# Patient Record
Sex: MALE | Race: WHITE
[De-identification: names, ages, dates, MRNs, and addresses within clinical notes are randomized per-mention and may not be internally consistent; named-entity substitution may affect disease eponyms.]

---

## 2023-10-25 ENCOUNTER — HOSPITAL ENCOUNTER (INPATIENT)
Dept: HOSPITAL 47 - EC | Age: 63
LOS: 3 days | Discharge: HOME | DRG: 897 | End: 2023-10-28
Attending: INTERNAL MEDICINE | Admitting: INTERNAL MEDICINE
Payer: MEDICARE
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
## 2023-10-25 DIAGNOSIS — F17.210: ICD-10-CM

## 2023-10-25 DIAGNOSIS — E87.6: ICD-10-CM

## 2023-10-25 DIAGNOSIS — F10.239: Primary | ICD-10-CM

## 2023-10-25 DIAGNOSIS — Z28.310: ICD-10-CM

## 2023-10-25 DIAGNOSIS — I10: ICD-10-CM

## 2023-10-25 LAB
ALBUMIN SERPL-MCNC: 4.8 G/DL (ref 3.5–5)
ALP SERPL-CCNC: 117 U/L (ref 38–126)
ALT SERPL-CCNC: 89 U/L (ref 4–49)
ANION GAP SERPL CALC-SCNC: 20 MMOL/L
AST SERPL-CCNC: 79 U/L (ref 17–59)
BASOPHILS # BLD AUTO: 0 K/UL (ref 0–0.2)
BASOPHILS NFR BLD AUTO: 0 %
BUN SERPL-SCNC: 11 MG/DL (ref 9–20)
CALCIUM SPEC-MCNC: 9.2 MG/DL (ref 8.4–10.2)
CHLORIDE SERPL-SCNC: 101 MMOL/L (ref 98–107)
CO2 SERPL-SCNC: 18 MMOL/L (ref 22–30)
EOSINOPHIL # BLD AUTO: 0 K/UL (ref 0–0.7)
EOSINOPHIL NFR BLD AUTO: 1 %
ERYTHROCYTE [DISTWIDTH] IN BLOOD BY AUTOMATED COUNT: 5.3 M/UL (ref 4.3–5.9)
ERYTHROCYTE [DISTWIDTH] IN BLOOD: 13.2 % (ref 11.5–15.5)
GLUCOSE SERPL-MCNC: 103 MG/DL (ref 74–99)
HCT VFR BLD AUTO: 47.9 % (ref 39–53)
HGB BLD-MCNC: 17 GM/DL (ref 13–17.5)
INR PPP: 1 (ref ?–1.2)
LACTATE BLDV-SCNC: 2 MMOL/L (ref 0.7–2)
LYMPHOCYTES # SPEC AUTO: 0.5 K/UL (ref 1–4.8)
LYMPHOCYTES NFR SPEC AUTO: 10 %
MCH RBC QN AUTO: 32.1 PG (ref 25–35)
MCHC RBC AUTO-ENTMCNC: 35.5 G/DL (ref 31–37)
MCV RBC AUTO: 90.3 FL (ref 80–100)
MONOCYTES # BLD AUTO: 0.2 K/UL (ref 0–1)
MONOCYTES NFR BLD AUTO: 4 %
NEUTROPHILS # BLD AUTO: 4.2 K/UL (ref 1.3–7.7)
NEUTROPHILS NFR BLD AUTO: 85 %
PLATELET # BLD AUTO: 134 K/UL (ref 150–450)
POTASSIUM SERPL-SCNC: 3.7 MMOL/L (ref 3.5–5.1)
PROT SERPL-MCNC: 8.1 G/DL (ref 6.3–8.2)
PT BLD: 11 SEC (ref 10–12.5)
SODIUM SERPL-SCNC: 139 MMOL/L (ref 137–145)
WBC # BLD AUTO: 4.9 K/UL (ref 3.8–10.6)

## 2023-10-25 PROCEDURE — 96376 TX/PRO/DX INJ SAME DRUG ADON: CPT

## 2023-10-25 PROCEDURE — 85610 PROTHROMBIN TIME: CPT

## 2023-10-25 PROCEDURE — 80320 DRUG SCREEN QUANTALCOHOLS: CPT

## 2023-10-25 PROCEDURE — 93306 TTE W/DOPPLER COMPLETE: CPT

## 2023-10-25 PROCEDURE — 96372 THER/PROPH/DIAG INJ SC/IM: CPT

## 2023-10-25 PROCEDURE — 80048 BASIC METABOLIC PNL TOTAL CA: CPT

## 2023-10-25 PROCEDURE — 84484 ASSAY OF TROPONIN QUANT: CPT

## 2023-10-25 PROCEDURE — 82140 ASSAY OF AMMONIA: CPT

## 2023-10-25 PROCEDURE — 96374 THER/PROPH/DIAG INJ IV PUSH: CPT

## 2023-10-25 PROCEDURE — 71046 X-RAY EXAM CHEST 2 VIEWS: CPT

## 2023-10-25 PROCEDURE — 96375 TX/PRO/DX INJ NEW DRUG ADDON: CPT

## 2023-10-25 PROCEDURE — 85025 COMPLETE CBC W/AUTO DIFF WBC: CPT

## 2023-10-25 PROCEDURE — 99285 EMERGENCY DEPT VISIT HI MDM: CPT

## 2023-10-25 PROCEDURE — 96361 HYDRATE IV INFUSION ADD-ON: CPT

## 2023-10-25 PROCEDURE — 83605 ASSAY OF LACTIC ACID: CPT

## 2023-10-25 PROCEDURE — 36415 COLL VENOUS BLD VENIPUNCTURE: CPT

## 2023-10-25 PROCEDURE — 94760 N-INVAS EAR/PLS OXIMETRY 1: CPT

## 2023-10-25 PROCEDURE — 80053 COMPREHEN METABOLIC PANEL: CPT

## 2023-10-25 PROCEDURE — 93880 EXTRACRANIAL BILAT STUDY: CPT

## 2023-10-25 PROCEDURE — 93005 ELECTROCARDIOGRAM TRACING: CPT

## 2023-10-25 RX ADMIN — CEFAZOLIN SCH MLS/HR: 330 INJECTION, POWDER, FOR SOLUTION INTRAMUSCULAR; INTRAVENOUS at 18:18

## 2023-10-25 RX ADMIN — HYDRALAZINE HYDROCHLORIDE PRN MG: 20 INJECTION INTRAMUSCULAR; INTRAVENOUS at 18:16

## 2023-10-25 NOTE — XR
EXAMINATION TYPE: XR chest 2V

 

DATE OF EXAM: 10/25/2023

 

COMPARISON: None

 

INDICATION: Dizziness nondeviated

 

TECHNIQUE:  Frontal and lateral views of the chest are obtained. Lateral right costophrenic angles at
 the edge of field of view.

 

FINDINGS:  

The heart size is normal.  

The pulmonary vasculature is normal.

The lungs are clear.

 

IMPRESSION:  

1. No acute pulmonary process.
Patent

## 2023-10-25 NOTE — ED
General Adult HPI





- General


Chief complaint: Dizziness


Stated complaint: Hypertension


Time Seen by Provider: 10/25/23 14:00


Source: patient


Mode of arrival: EMS


Limitations: no limitations





- History of Present Illness


Initial comments: 





63-year-old male with past medical history of hypertension who presents to the 

emergency department reporting high blood pressure.  States that his blood 

pressure has been high for the past 3-4 days.  Reports that when he moves around

he gets extremely lightheaded.  States that he has a history of high blood 

pressure however when he saw his primary care in office earlier this year, it 

was not high enough to put him on any medications.  He denies cardiac disease.  

He denies any headaches or chest pain.  Patient found to be extremely tremulous.

 Reports that he drinks a pink of vodka twice a week.  Reports that when he 

drinks, his blood pressure goes high.  Patient questions whether he is ALLERGIC 

to vodka.  He denies daily drinking.  Denies history of withdrawal.  No 

alleviating, precipitating or modifying factors





Sons do present to bedside and state that the patient lives in a [facility] in 

[place]





- Related Data


                                Home Medications











 Medication  Instructions  Recorded  Confirmed


 


Cyclobenzaprine [Flexeril] 10 mg PO HS PRN 10/25/23 10/25/23


 


Ibuprofen [Motrin] 600 mg PO TID PRN 10/25/23 10/25/23











                                    Allergies











Allergy/AdvReac Type Severity Reaction Status Date / Time


 


No Known Allergies Allergy   Verified 10/25/23 14:55














Review of Systems


ROS Statement: 


Those systems with pertinent positive or pertinent negative responses have been 

documented in the HPI.





ROS Other: All systems not noted in ROS Statement are negative.





Past Medical History


Past Medical History: Hypertension


Past Surgical History: Tonsillectomy


Past Psychological History: No Psychological Hx Reported


Smoking Status: Current every day smoker


Past Alcohol Use History: Occasional


Past Drug Use History: None Reported





General Exam


Limitations: no limitations


General appearance: alert, anxious, other (Tremulous)


Head exam: Present: atraumatic, normocephalic, normal inspection


Eye exam: Present: normal appearance, PERRL, EOMI.  Absent: scleral icterus, 

conjunctival injection, periorbital swelling


ENT exam: Present: normal exam, mucous membranes moist


Neck exam: Present: normal inspection.  Absent: tenderness, meningismus, 

lymphadenopathy


Respiratory exam: Present: normal lung sounds bilaterally.  Absent: respiratory 

distress, wheezes, rales, rhonchi, stridor


Cardiovascular Exam: Present: regular rate, normal rhythm, normal heart sounds. 

 Absent: systolic murmur, diastolic murmur, rubs, gallop, clicks


GI/Abdominal exam: Present: soft, normal bowel sounds.  Absent: distended, 

tenderness, guarding, rebound, rigid


Extremities exam: Present: normal inspection, full ROM, normal capillary refill.

  Absent: tenderness, pedal edema, joint swelling, calf tenderness


Back exam: Present: normal inspection


Neurological exam: Present: alert, oriented X3, CN II-XII intact


Psychiatric exam: Present: normal affect, normal mood


Skin exam: Present: warm, dry, intact, normal color.  Absent: rash





Course


                                   Vital Signs











  10/25/23 10/25/23 10/25/23





  13:43 14:30 15:00


 


Temperature 97.9 F  


 


Pulse Rate 89 86 92


 


Respiratory 20 28 H 20





Rate   


 


Blood Pressure 191/124 203/124 184/105


 


O2 Sat by Pulse 96 96 98





Oximetry   














  10/25/23 10/25/23 10/25/23





  15:30 16:00 16:30


 


Temperature   


 


Pulse Rate 87 105 H 96


 


Respiratory 16 29 H 13





Rate   


 


Blood Pressure 206/113 191/117 193/119


 


O2 Sat by Pulse 98  





Oximetry   














  10/25/23 10/25/23 10/25/23





  17:00 17:30 18:00


 


Temperature   


 


Pulse Rate 90 88 


 


Respiratory 28 H 23 





Rate   


 


Blood Pressure 190/112 171/119 177/115


 


O2 Sat by Pulse   





Oximetry   














  10/25/23 10/25/23 10/25/23





  19:40 20:14 21:56


 


Temperature   


 


Pulse Rate 89 73 83


 


Respiratory 21 18 19





Rate   


 


Blood Pressure 165/112 151/81 151/83


 


O2 Sat by Pulse 97 96 97





Oximetry   














Medical Decision Making





- Medical Decision Making


Was pt. sent in by a medical professional or institution (, PA, NP, urgent 

care, hospital, or nursing home...) When possible be specific


@  -No


Did you speak to anyone other than the patient for history (EMS, parent, family,

 police, friend...)? What history was obtained from this source 


@  -Spoke with EMS


Did you review nursing and triage notes (agree or disagree)?  Why? 


@  -I reviewed and agree with nursing and triage notes


Were old charts reviewed (outside hosp., previous admission, EMS record, old 

EKG, old radiological studies, urgent care reports/EKG's, nursing home records)?

 Report findings 


@  -No old charts were reviewed


Differential Diagnosis (chest pain, altered mental status, abdominal pain women,

 abdominal pain men, vaginal bleeding, weakness, fever, dyspnea, syncope, 

headache, dizziness, GI bleed, back pain, seizure, CVA, palpatations, mental 

health, musculoskeletal)? 


@  -Differential Weakness:


Hypoglycemia, shock, sepsis, hyponatremia, anemia, infection, MI, ETOH, adverse 

medicine reaction, overdose, stroke, this is not meant to be an all-inclusive 

list.


EKG interpreted by me (3pts min.).


@  -Yes and demonstrates sinus rhythm with a sinus arrhythmia.  Rate of 90.  AL 

interval 177.  QRS 97.  QTC of 412


X-rays interpreted by me (1pt min.).


@  -Yes and demonstrates no acute intrathoracic process


CT interpreted by me (1pt min.).


@  -None done


U/S interpreted by me (1pt. min.).


@  -None done


What testing was considered but not performed or refused? (CT, X-rays, U/S, 

labs)? Why?


@  -None


What meds were considered but not given or refused? Why?


@  -None


Did you discuss the management of the patient with other professionals 

(professionals i.e. , PA, NP, lab, RT, psych nurse, , , 

teacher, , )? Give summary


@  -Spoke with Dr. Parrish who will admit patient


Was smoking cessation discussed for >3mins.?


@  -No


Was critical care preformed (if so, how long)?


@  -No


Were there social determinants of health that impacted care today? How? 

(Homelessness, low income, unemployed, alcoholism, drug addiction, 

transportation, low edu. Level, literacy, decrease access to med. care, shelter, 

rehab)?


@  -Patient is reportedly living in a shack, alcohol use


Was there de-escalation of care discussed even if they declined (Discuss DNR or 

withdrawal of care, Hospice)? DNR status


@  -No


What co-morbidities impacted this encounter? (DM, HTN, Smoking, COPD, CAD, 

Cancer, CVA, ARF, Chemo, Hep., AIDS, mental health diagnosis, sleep apnea, 

morbid obesity)?


@  -htn


Was patient admitted / discharged? Hospital course, mention meds given and 

route, prescriptions, significant lab abnormalities, going to OR and other 

pertinent info.


@  -Upon arrival patient was placed into room 22.  Thorough history and physical

 exam was performed.  Alcohol withdrawal scale was performed and patient scores 

a 14.  He was given 1 mg of Ativan.  Laboratory studies are conducted.  Patient 

given IV fluids.  Upon return results they are discussed with the patient.  He 

has had minimal improvement in his blood pressure however his tremulousness has 

slowed.  I discussed the care with the patient and recommended admission to be 

placed on a CIWA to call.  Patient was agreeable to this.  Spoke with Dr. Parrish

 who agreed to admit the patient.  Requesting hydralazine to be on for high 

blood pressure.  I will consult social work


Undiagnosed new problem with uncertain prognosis?


@  -Yes


Drug Therapy requiring intensive monitoring for toxicity (Heparin, Nitro, 

Insulin, Cardizem)?


@  -No


Were any procedures done?


@  -No


Diagnosis/symptom?


@  -accelerated hypertension, suspected alcohol withdrawal


Acute, or Chronic, or Acute on Chronic?


@  -acute


Uncomplicated (without systemic symptoms) or Complicated (systemic symptoms)?


@  -complicated


Side effects of treatment?


@  -No


Exacerbation, Progression, or Severe Exacerbation?


@  -No


Poses a threat to life or bodily function? How? (Chest pain, USA, MI, pneumonia,

 PE, COPD, DKA, ARF, appy, cholecystitis, CVA, Diverticulitis, Homicidal, 

Suicidal, threat to staff... and all critical care pts)


@  -Yes patient appears to be going through alcohol withdrawal





- Lab Data


Result diagrams: 


                                 10/25/23 14:08





                                 10/25/23 14:08


                                   Lab Results











  10/25/23 10/25/23 10/25/23 Range/Units





  14:08 14:08 14:08 


 


WBC  4.9    (3.8-10.6)  k/uL


 


RBC  5.30    (4.30-5.90)  m/uL


 


Hgb  17.0    (13.0-17.5)  gm/dL


 


Hct  47.9    (39.0-53.0)  %


 


MCV  90.3    (80.0-100.0)  fL


 


MCH  32.1    (25.0-35.0)  pg


 


MCHC  35.5    (31.0-37.0)  g/dL


 


RDW  13.2    (11.5-15.5)  %


 


Plt Count  134 L    (150-450)  k/uL


 


MPV  7.1    


 


Neutrophils %  85    %


 


Lymphocytes %  10    %


 


Monocytes %  4    %


 


Eosinophils %  1    %


 


Basophils %  0    %


 


Neutrophils #  4.2    (1.3-7.7)  k/uL


 


Lymphocytes #  0.5 L    (1.0-4.8)  k/uL


 


Monocytes #  0.2    (0-1.0)  k/uL


 


Eosinophils #  0.0    (0-0.7)  k/uL


 


Basophils #  0.0    (0-0.2)  k/uL


 


PT   11.0   (10.0-12.5)  sec


 


INR   1.0   (<1.2)  


 


Sodium    139  (137-145)  mmol/L


 


Potassium    3.7  (3.5-5.1)  mmol/L


 


Chloride    101  ()  mmol/L


 


Carbon Dioxide    18 L  (22-30)  mmol/L


 


Anion Gap    20  mmol/L


 


BUN    11  (9-20)  mg/dL


 


Creatinine    0.75  (0.66-1.25)  mg/dL


 


Est GFR (CKD-EPI)AfAm    >90  (>60 ml/min/1.73 sqM)  


 


Est GFR (CKD-EPI)NonAf    >90  (>60 ml/min/1.73 sqM)  


 


Glucose    103 H  (74-99)  mg/dL


 


Lactic Ac Sepsis Rflx     


 


Plasma Lactic Acid Luis     (0.7-2.0)  mmol/L


 


Calcium    9.2  (8.4-10.2)  mg/dL


 


Total Bilirubin    1.9 H  (0.2-1.3)  mg/dL


 


AST    79 H  (17-59)  U/L


 


ALT    89 H  (4-49)  U/L


 


Alkaline Phosphatase    117  ()  U/L


 


Troponin I     (0.000-0.034)  ng/mL


 


Total Protein    8.1  (6.3-8.2)  g/dL


 


Albumin    4.8  (3.5-5.0)  g/dL


 


Serum Alcohol     mg/dL














  10/25/23 10/25/23 10/25/23 Range/Units





  14:08 14:08 14:37 


 


WBC     (3.8-10.6)  k/uL


 


RBC     (4.30-5.90)  m/uL


 


Hgb     (13.0-17.5)  gm/dL


 


Hct     (39.0-53.0)  %


 


MCV     (80.0-100.0)  fL


 


MCH     (25.0-35.0)  pg


 


MCHC     (31.0-37.0)  g/dL


 


RDW     (11.5-15.5)  %


 


Plt Count     (150-450)  k/uL


 


MPV     


 


Neutrophils %     %


 


Lymphocytes %     %


 


Monocytes %     %


 


Eosinophils %     %


 


Basophils %     %


 


Neutrophils #     (1.3-7.7)  k/uL


 


Lymphocytes #     (1.0-4.8)  k/uL


 


Monocytes #     (0-1.0)  k/uL


 


Eosinophils #     (0-0.7)  k/uL


 


Basophils #     (0-0.2)  k/uL


 


PT     (10.0-12.5)  sec


 


INR     (<1.2)  


 


Sodium     (137-145)  mmol/L


 


Potassium     (3.5-5.1)  mmol/L


 


Chloride     ()  mmol/L


 


Carbon Dioxide     (22-30)  mmol/L


 


Anion Gap     mmol/L


 


BUN     (9-20)  mg/dL


 


Creatinine     (0.66-1.25)  mg/dL


 


Est GFR (CKD-EPI)AfAm     (>60 ml/min/1.73 sqM)  


 


Est GFR (CKD-EPI)NonAf     (>60 ml/min/1.73 sqM)  


 


Glucose     (74-99)  mg/dL


 


Lactic Ac Sepsis Rflx    Y  


 


Plasma Lactic Acid Luis  4.7 H*    (0.7-2.0)  mmol/L


 


Calcium     (8.4-10.2)  mg/dL


 


Total Bilirubin     (0.2-1.3)  mg/dL


 


AST     (17-59)  U/L


 


ALT     (4-49)  U/L


 


Alkaline Phosphatase     ()  U/L


 


Troponin I   <0.012   (0.000-0.034)  ng/mL


 


Total Protein     (6.3-8.2)  g/dL


 


Albumin     (3.5-5.0)  g/dL


 


Serum Alcohol     mg/dL














  10/25/23 Range/Units





  16:43 


 


WBC   (3.8-10.6)  k/uL


 


RBC   (4.30-5.90)  m/uL


 


Hgb   (13.0-17.5)  gm/dL


 


Hct   (39.0-53.0)  %


 


MCV   (80.0-100.0)  fL


 


MCH   (25.0-35.0)  pg


 


MCHC   (31.0-37.0)  g/dL


 


RDW   (11.5-15.5)  %


 


Plt Count   (150-450)  k/uL


 


MPV   


 


Neutrophils %   %


 


Lymphocytes %   %


 


Monocytes %   %


 


Eosinophils %   %


 


Basophils %   %


 


Neutrophils #   (1.3-7.7)  k/uL


 


Lymphocytes #   (1.0-4.8)  k/uL


 


Monocytes #   (0-1.0)  k/uL


 


Eosinophils #   (0-0.7)  k/uL


 


Basophils #   (0-0.2)  k/uL


 


PT   (10.0-12.5)  sec


 


INR   (<1.2)  


 


Sodium   (137-145)  mmol/L


 


Potassium   (3.5-5.1)  mmol/L


 


Chloride   ()  mmol/L


 


Carbon Dioxide   (22-30)  mmol/L


 


Anion Gap   mmol/L


 


BUN   (9-20)  mg/dL


 


Creatinine   (0.66-1.25)  mg/dL


 


Est GFR (CKD-EPI)AfAm   (>60 ml/min/1.73 sqM)  


 


Est GFR (CKD-EPI)NonAf   (>60 ml/min/1.73 sqM)  


 


Glucose   (74-99)  mg/dL


 


Lactic Ac Sepsis Rflx   


 


Plasma Lactic Acid Luis   (0.7-2.0)  mmol/L


 


Calcium   (8.4-10.2)  mg/dL


 


Total Bilirubin   (0.2-1.3)  mg/dL


 


AST   (17-59)  U/L


 


ALT   (4-49)  U/L


 


Alkaline Phosphatase   ()  U/L


 


Troponin I   (0.000-0.034)  ng/mL


 


Total Protein   (6.3-8.2)  g/dL


 


Albumin   (3.5-5.0)  g/dL


 


Serum Alcohol  <10  mg/dL














Disposition


Clinical Impression: 


 Accelerated hypertension, Alcohol withdrawal





Disposition: ADMITTED AS IP TO THIS HOSP


Condition: Serious


Is patient prescribed a controlled substance at d/c from ED?: No


Time of Disposition: 16:45


Decision to Admit Reason: Admit from EC


Decision Date: 10/25/23


Decision Time: 16:45

## 2023-10-26 LAB
ANION GAP SERPL CALC-SCNC: 10 MMOL/L
BASOPHILS # BLD AUTO: 0 K/UL (ref 0–0.2)
BASOPHILS NFR BLD AUTO: 0 %
BUN SERPL-SCNC: 8 MG/DL (ref 9–20)
CALCIUM SPEC-MCNC: 8.5 MG/DL (ref 8.4–10.2)
CHLORIDE SERPL-SCNC: 102 MMOL/L (ref 98–107)
CO2 SERPL-SCNC: 23 MMOL/L (ref 22–30)
EOSINOPHIL # BLD AUTO: 0.1 K/UL (ref 0–0.7)
EOSINOPHIL NFR BLD AUTO: 1 %
ERYTHROCYTE [DISTWIDTH] IN BLOOD BY AUTOMATED COUNT: 5.04 M/UL (ref 4.3–5.9)
ERYTHROCYTE [DISTWIDTH] IN BLOOD: 13.4 % (ref 11.5–15.5)
GLUCOSE SERPL-MCNC: 92 MG/DL (ref 74–99)
HCT VFR BLD AUTO: 45.9 % (ref 39–53)
HGB BLD-MCNC: 15.6 GM/DL (ref 13–17.5)
LYMPHOCYTES # SPEC AUTO: 0.8 K/UL (ref 1–4.8)
LYMPHOCYTES NFR SPEC AUTO: 13 %
MCH RBC QN AUTO: 31 PG (ref 25–35)
MCHC RBC AUTO-ENTMCNC: 34.1 G/DL (ref 31–37)
MCV RBC AUTO: 91 FL (ref 80–100)
MONOCYTES # BLD AUTO: 0.3 K/UL (ref 0–1)
MONOCYTES NFR BLD AUTO: 5 %
NEUTROPHILS # BLD AUTO: 4.6 K/UL (ref 1.3–7.7)
NEUTROPHILS NFR BLD AUTO: 80 %
PLATELET # BLD AUTO: 105 K/UL (ref 150–450)
POTASSIUM SERPL-SCNC: 3.3 MMOL/L (ref 3.5–5.1)
SODIUM SERPL-SCNC: 135 MMOL/L (ref 137–145)
WBC # BLD AUTO: 5.7 K/UL (ref 3.8–10.6)

## 2023-10-26 PROCEDURE — HZ2ZZZZ DETOXIFICATION SERVICES FOR SUBSTANCE ABUSE TREATMENT: ICD-10-PCS

## 2023-10-26 RX ADMIN — ACETAMINOPHEN PRN MG: 325 TABLET, FILM COATED ORAL at 14:41

## 2023-10-26 RX ADMIN — CEFAZOLIN SCH: 330 INJECTION, POWDER, FOR SOLUTION INTRAMUSCULAR; INTRAVENOUS at 06:01

## 2023-10-26 RX ADMIN — HYDRALAZINE HYDROCHLORIDE PRN MG: 20 INJECTION INTRAMUSCULAR; INTRAVENOUS at 01:38

## 2023-10-26 RX ADMIN — HYDRALAZINE HYDROCHLORIDE PRN MG: 20 INJECTION INTRAMUSCULAR; INTRAVENOUS at 14:58

## 2023-10-26 RX ADMIN — POTASSIUM CHLORIDE SCH MEQ: 20 TABLET, EXTENDED RELEASE ORAL at 14:41

## 2023-10-26 RX ADMIN — Medication SCH MG: at 09:03

## 2023-10-26 RX ADMIN — POTASSIUM CHLORIDE SCH MEQ: 20 TABLET, EXTENDED RELEASE ORAL at 11:52

## 2023-10-26 NOTE — US
EXAMINATION TYPE: US carotid duplex BILAT

 

DATE OF EXAM: 10/26/2023

 

COMPARISON: NONE

 

CLINICAL INDICATION: Male, 63 years old with history of dizziness; Dizziness. Current smoker. Hyperte
nsion.

 

TECHNIQUE: Carotid duplex ultrasound examination. Indirect Doppler criteria was utilized.

 

FINDINGS:

 

EXAM MEASUREMENTS: 

 

RIGHT:  Peak Systolic Velocity (PSV) cm/sec

----- Right CCA:  91.5  

----- Right ICA:  60.1     

----- Right ECA:  86.6   

ICA/CCA ratio:  0.7    

 

RIGHT:  End Diastole cm/sec

----- Right CCA:  20.2   

----- Right ICA:  11.3      

----- Right ECA:  20.6     

 

LEFT:  Peak Systolic Velocity (PSV) cm/sec

----- Left CCA:  69.9  

----- Left ICA:  51.1   

----- Left ECA:  94.1  

ICA/CCA ratio:  0.7  

 

LEFT:  End Diastole cm/sec

----- Left CCA:  18.2  

----- Left ICA:  9.2   

----- Left ECA:  10.6 

 

VERTEBRALS (direction of flow):

Right Vertebral: Antegrade

Left Vertebral: Antegrade

 

Rhythm:  Normal

 

SONOGRAPHER NOTES: No elevated velocities at this time. Minimal plaque seen within bilateral bulbs.

 

 

 

IMPRESSION:  

No ultrasound evidence for hemodynamically significant stenosis of the bilateral visualized carotid a
rterial systems.   

 

 

 

 

 

 

Criteria for Assigning % of Stenosis / Diameter reduction

(Estimation based on the indirect measurements of the internal carotid artery velocities (ICA PSV).

1.  Normal (no stenosis)=ICA PSV < 125 cm/s: ratio < 2.0: ICA EDV<40 cm/s.

2. Less than 50% stenosis=ICA PSV < 125 cm/s: ratio < 2.0: ICA EDV<40 cm/s.

3.  50 to 69% stenosis=ICA PSV of 125 to 230 cm/s: ration 2.0 ? 4.0: ICA EDV  cm/s.

4.  Greater than 70% stenosis to near occlusion= ICA PSV > 230 cm/s: ratio > 4.0: ICA EDV > 100 cm/s.
 

5.  Near occlusion= ICA PSV velocities may be low or undetectable: variable ratio and ICA EDV.

6.  Total occlusion=unable to detect flow.
pain rating (specify)

## 2023-10-26 NOTE — P.HPIM
History of Present Illness


H&P Date: 10/26/23


Chief Complaint: Accelerated hypertension EtOH withdrawal








Abhishek Ko, is a 63-year-old male patient of Dr. sparks.  Patient reports 

that he feels his blood pressure is high over the past 3-4 days with episodes of

dizziness.  Patient reports he drinks a pint of vodka couple times a week.  

Symptoms reportedly started after he drink.  Patient denies being on any 

medications for blood pressure.  Chest x-ray completed showing no acute 

pulmonary process.  EKG completed showing sinus rhythm with sinus arrhythmia.  

Past medical history includes hypertension, EtOH and nicotine dependence.  

Initial lactic acid elevated at 4. 7 repeat 2.0.  At this time patient will be 

admitted started on alcohol withdrawal protocol.  Hydralazine when necessary 

ordered patient also started on Norvasc.  Will order carotid Doppler and 2-D 

echo replaced selectively per protocol.  Social work consult placed for 

discharge planning.  Current vital signs temp 96.5, heart rate 73, respiratory 

rate 18, blood pressure 153/90 with a pulse ox of 96% on room air.  At this time

patient denies chest pain or shortness of breath.  Patient denies nausea 

vomiting or diarrhea.  Patient denies any urinary burning or frequency





Review of Systems





Please refer to HPI otherwise unremarkable





Past Medical History


Past Medical History: Hypertension


History of Any Multi-Drug Resistant Organisms: None Reported


Past Surgical History: Tonsillectomy


Additional Past Surgical History / Comment(s): ENT surgery


Past Anesthesia/Blood Transfusion Reactions: No Reported Reaction


Past Psychological History: No Psychological Hx Reported


Smoking Status: Current every day smoker


Past Alcohol Use History: Occasional


Past Drug Use History: None Reported





Medications and Allergies


                                Home Medications











 Medication  Instructions  Recorded  Confirmed  Type


 


Cyclobenzaprine [Flexeril] 10 mg PO HS PRN 10/25/23 10/25/23 History


 


Ibuprofen [Motrin] 600 mg PO TID PRN 10/25/23 10/25/23 History








                                    Allergies











Allergy/AdvReac Type Severity Reaction Status Date / Time


 


No Known Allergies Allergy   Verified 10/25/23 14:55














Physical Exam


Vitals: 


                                   Vital Signs











  Temp Pulse Pulse Resp BP BP Pulse Ox


 


 10/26/23 09:02        96


 


 10/26/23 08:00  96.5 F L   73  18   153/90  94 L


 


 10/26/23 04:00    69  16   157/94  97


 


 10/26/23 01:35       163/111 


 


 10/25/23 23:00  97.6 F   79  16   184/110  96


 


 10/25/23 21:56   83   19  151/83   97


 


 10/25/23 20:14   73   18  151/81   96


 


 10/25/23 19:40   89   21  165/112   97


 


 10/25/23 18:00      177/115  


 


 10/25/23 17:30   88   23  171/119  


 


 10/25/23 17:00   90   28 H  190/112  


 


 10/25/23 16:30   96   13  193/119  


 


 10/25/23 16:00   105 H   29 H  191/117  


 


 10/25/23 15:30   87   16  206/113   98


 


 10/25/23 15:00   92   20  184/105   98


 


 10/25/23 14:30   86   28 H  203/124   96


 


 10/25/23 13:43  97.9 F  89   20  191/124   96








                                Intake and Output











 10/25/23 10/26/23 10/26/23





 22:59 06:59 14:59


 


Intake Total  10 


 


Balance  10 


 


Intake:   


 


  IV  10 


 


    Invasive Line 1  10 


 


Other:   


 


  Voiding Method  Toilet 


 


  # Voids  1 1


 


  Weight 99.79 kg  




















In general patient is alert and oriented ?-3 in no distress


HEENT head normocephalic and atraumatic


Neck is supple no JVD no goiter no lymphadenopathy no carotid bruit


Chest examination reveals a few scattered crackles bilaterally no wheezing


Cardiac exam reveals regular heart sounds S1 and S2 no gallops no murmurs


Abdomen is soft nontender no organomegaly with normal bowel sounds


Extremity exam reveals no edema no cyanosis or clubbing


Neurological examination reveals no gross focal deficits





Results


CBC & Chem 7: 


                                 10/26/23 06:59





                                 10/26/23 06:59


Labs: 


                  Abnormal Lab Results - Last 24 Hours (Table)











  10/25/23 10/25/23 10/25/23 Range/Units





  14:08 14:08 14:08 


 


Plt Count  134 L    (150-450)  k/uL


 


Lymphocytes #  0.5 L    (1.0-4.8)  k/uL


 


Sodium     (137-145)  mmol/L


 


Potassium     (3.5-5.1)  mmol/L


 


Carbon Dioxide   18 L   (22-30)  mmol/L


 


BUN     (9-20)  mg/dL


 


Creatinine     (0.66-1.25)  mg/dL


 


Glucose   103 H   (74-99)  mg/dL


 


Plasma Lactic Acid Luis    4.7 H*  (0.7-2.0)  mmol/L


 


Total Bilirubin   1.9 H   (0.2-1.3)  mg/dL


 


AST   79 H   (17-59)  U/L


 


ALT   89 H   (4-49)  U/L














  10/26/23 10/26/23 Range/Units





  06:59 06:59 


 


Plt Count  105 L   (150-450)  k/uL


 


Lymphocytes #  0.8 L   (1.0-4.8)  k/uL


 


Sodium   135 L  (137-145)  mmol/L


 


Potassium   3.3 L  (3.5-5.1)  mmol/L


 


Carbon Dioxide    (22-30)  mmol/L


 


BUN   8 L  (9-20)  mg/dL


 


Creatinine   0.64 L  (0.66-1.25)  mg/dL


 


Glucose    (74-99)  mg/dL


 


Plasma Lactic Acid Luis    (0.7-2.0)  mmol/L


 


Total Bilirubin    (0.2-1.3)  mg/dL


 


AST    (17-59)  U/L


 


ALT    (4-49)  U/L














Thrombosis Risk Factor Assmnt





- Choose All That Apply


Any of the Below Risk Factors Present?: Yes


Each Factor Represents 1 point: Obesity (BMI >25)


Other Risk Factors: Yes


Each Risk Factor Represents 2 Points: Age 61-74 years


Other congenital or acquired thrombophilia - If yes, enter type in comment: No


Thrombosis Risk Factor Assessment Total Risk Factor Score: 3


Thrombosis Risk Factor Assessment Level: Moderate Risk





Assessment and Plan


Assessment: 








1.  Accelerated hypertension





2.  EtOH withdrawal





3.  Nicotine dependence





4.  Episode of dizziness





5. hypokalmeia.  Replace per protocol





6.  Elevated liver enzymes secondary to EtOH





7.  Cough after eating, will check swallow evaluation





DVT prophylaxis Lovenox.  GI prophylaxis Protonix


Carotid Doppler and 2-D echo ordered


alcohol withdrawal protocol ordered


Repeat labs ordered


Social work services consulted


Time with Patient: Greater than 30 (Greater than 60% of the total time spent in 

counseling and coordination of care)

## 2023-10-27 VITALS — TEMPERATURE: 98.7 F

## 2023-10-27 LAB
ALBUMIN SERPL-MCNC: 4 G/DL (ref 3.5–5)
ALP SERPL-CCNC: 84 U/L (ref 38–126)
ALT SERPL-CCNC: 89 U/L (ref 4–49)
ANION GAP SERPL CALC-SCNC: 11 MMOL/L
AST SERPL-CCNC: 101 U/L (ref 17–59)
BASOPHILS # BLD AUTO: 0 K/UL (ref 0–0.2)
BASOPHILS NFR BLD AUTO: 0 %
BUN SERPL-SCNC: 10 MG/DL (ref 9–20)
CALCIUM SPEC-MCNC: 8.9 MG/DL (ref 8.4–10.2)
CHLORIDE SERPL-SCNC: 104 MMOL/L (ref 98–107)
CO2 SERPL-SCNC: 22 MMOL/L (ref 22–30)
EOSINOPHIL # BLD AUTO: 0.1 K/UL (ref 0–0.7)
EOSINOPHIL NFR BLD AUTO: 3 %
ERYTHROCYTE [DISTWIDTH] IN BLOOD BY AUTOMATED COUNT: 5.05 M/UL (ref 4.3–5.9)
ERYTHROCYTE [DISTWIDTH] IN BLOOD: 13.2 % (ref 11.5–15.5)
GLUCOSE SERPL-MCNC: 99 MG/DL (ref 74–99)
HCT VFR BLD AUTO: 46.2 % (ref 39–53)
HGB BLD-MCNC: 16.2 GM/DL (ref 13–17.5)
LYMPHOCYTES # SPEC AUTO: 1 K/UL (ref 1–4.8)
LYMPHOCYTES NFR SPEC AUTO: 18 %
MCH RBC QN AUTO: 32.1 PG (ref 25–35)
MCHC RBC AUTO-ENTMCNC: 35 G/DL (ref 31–37)
MCV RBC AUTO: 91.6 FL (ref 80–100)
MONOCYTES # BLD AUTO: 0.2 K/UL (ref 0–1)
MONOCYTES NFR BLD AUTO: 4 %
NEUTROPHILS # BLD AUTO: 4.1 K/UL (ref 1.3–7.7)
NEUTROPHILS NFR BLD AUTO: 74 %
PLATELET # BLD AUTO: 107 K/UL (ref 150–450)
POTASSIUM SERPL-SCNC: 4 MMOL/L (ref 3.5–5.1)
PROT SERPL-MCNC: 6.8 G/DL (ref 6.3–8.2)
SODIUM SERPL-SCNC: 137 MMOL/L (ref 137–145)
WBC # BLD AUTO: 5.5 K/UL (ref 3.8–10.6)

## 2023-10-27 RX ADMIN — CEFAZOLIN SCH MLS/HR: 330 INJECTION, POWDER, FOR SOLUTION INTRAMUSCULAR; INTRAVENOUS at 08:15

## 2023-10-27 RX ADMIN — ENOXAPARIN SODIUM SCH MG: 40 INJECTION SUBCUTANEOUS at 08:15

## 2023-10-27 RX ADMIN — PANTOPRAZOLE SODIUM SCH MG: 40 TABLET, DELAYED RELEASE ORAL at 06:04

## 2023-10-27 RX ADMIN — POTASSIUM CHLORIDE SCH MEQ: 20 TABLET, EXTENDED RELEASE ORAL at 06:04

## 2023-10-27 RX ADMIN — Medication SCH MG: at 08:15

## 2023-10-27 RX ADMIN — HYDRALAZINE HYDROCHLORIDE PRN MG: 20 INJECTION INTRAMUSCULAR; INTRAVENOUS at 15:42

## 2023-10-27 RX ADMIN — ACETAMINOPHEN PRN MG: 325 TABLET, FILM COATED ORAL at 20:21

## 2023-10-27 RX ADMIN — POTASSIUM CHLORIDE SCH MEQ: 20 TABLET, EXTENDED RELEASE ORAL at 07:03

## 2023-10-27 RX ADMIN — CEFAZOLIN SCH: 330 INJECTION, POWDER, FOR SOLUTION INTRAMUSCULAR; INTRAVENOUS at 05:51

## 2023-10-27 RX ADMIN — CEFAZOLIN SCH: 330 INJECTION, POWDER, FOR SOLUTION INTRAMUSCULAR; INTRAVENOUS at 23:29

## 2023-10-27 NOTE — P.PN
Subjective


Progress Note Date: 10/27/23





Abhishek Ko, is a 63-year-old male patient of Dr. sparks.  Patient reports 

that he feels his blood pressure is high over the past 3-4 days with episodes of

dizziness.  Patient reports he drinks a pint of vodka couple times a week.  

Symptoms reportedly started after he drink.  Patient denies being on any 

medications for blood pressure.  Chest x-ray completed showing no acute 

pulmonary process.  EKG completed showing sinus rhythm with sinus arrhythmia.  

Past medical history includes hypertension, EtOH and nicotine dependence.  

Initial lactic acid elevated at 4. 7 repeat 2.0.  At this time patient will be 

admitted started on alcohol withdrawal protocol.  Hydralazine when necessary 

ordered patient also started on Norvasc.  Will order carotid Doppler and 2-D 

echo replaced selectively per protocol.  Social work consult placed for 

discharge planning.  Current vital signs temp 96.5, heart rate 73, respiratory 

rate 18, blood pressure 153/90 with a pulse ox of 96% on room air.  At this time

patient denies chest pain or shortness of breath.  Patient denies nausea 

vomiting or diarrhea.  Patient denies any urinary burning or frequency





On 10/27/2023 patient is alert and oriented 3.  Patient denies any chest pain 

or shortness breath.  Patient denies nausea vomiting or diarrhea.  Patient was 

started on Norvasc for blood pressure control.  Current vital signs temp 98.7, 

heart rate 60, respiratory 19, blood pressure 151/95 with a pulse ox of 94%.  

Carotid Doppler was completed showing no ultrasound evidence for significant 

stenosis.  2-D echo has been more ordered.  Anticipate discharge in the next 24-

48 hours





Objective





- Vital Signs


Vital signs: 


                                   Vital Signs











Temp  98.7 F   10/27/23 08:15


 


Pulse  68   10/27/23 08:15


 


Resp  19   10/27/23 08:15


 


BP  162/110   10/27/23 08:15


 


Pulse Ox  95   10/27/23 08:34


 


FiO2  21   10/27/23 08:34








                                 Intake & Output











 10/26/23 10/27/23 10/27/23





 18:59 06:59 18:59


 


Weight   99.79 kg


 


Other:   


 


  Voiding Method  Toilet Toilet


 


  # Voids 1 0 














- Exam








In general patient is alert and oriented ?-3 in no distress


HEENT head normocephalic and atraumatic


Neck is supple no JVD no goiter no lymphadenopathy no carotid bruit


Chest examination reveals a few scattered crackles bilaterally no wheezing


Cardiac exam reveals regular heart sounds S1 and S2 no gallops no murmurs


Abdomen is soft nontender no organomegaly with normal bowel sounds


Extremity exam reveals no edema no cyanosis or clubbing


Neurological examination reveals no gross focal deficits








- Labs


CBC & Chem 7: 


                                 10/27/23 07:39





                                 10/27/23 07:39


Labs: 


                  Abnormal Lab Results - Last 24 Hours (Table)











  10/27/23 10/27/23 Range/Units





  07:39 07:39 


 


Plt Count  107 L   (150-450)  k/uL


 


Total Bilirubin   1.9 H  (0.2-1.3)  mg/dL


 


AST   101 H  (17-59)  U/L


 


ALT   89 H  (4-49)  U/L














Assessment and Plan


Assessment: 








1.  Accelerated hypertension





2.  EtOH withdrawal





3.  Nicotine dependence





4.  Episode of dizziness





5. hypokalmeia.  Replace per protocol





6.  Elevated liver enzymes secondary to EtOH





7.  Cough after eating, will check swallow evaluation





DVT prophylaxis Lovenox.  GI prophylaxis Protonix


Carotid Doppler and 2-D echo ordered


alcohol withdrawal protocol ordered


Repeat labs ordered


Social work services consulted

## 2023-10-27 NOTE — CA
Transthoracic Echo Report 

 Name: Abhishek Ko 

 MRN:    [id number] 

 Age:    63     Gender:     M 

 

 :    1960 

 Exam Date:     10/26/2023 13:45 

 Exam Location: [facility] 

 Ht (in):     70     Wt (lb):     220 

 Ordering Physician:        Efrain Parrish MD 

 Attending/Referring Phys: 

 Technician         Kemal Dyer 

 Procedure CPT: 

 Indications:       dizziness 

 

 Cardiac Hx: 

 Technical Quality:      Fair 

 Contrast 1:                                Total Dose (mL): 

 Contrast 2:                                Total Dose (mL): 

 

 MEASUREMENTS  (Male / Female) Normal Values 

 2D ECHO 

 LV Diastolic Diameter PLAX        4.5 cm                4.2 - 5.9 / 3.9 - 5.3 cm 

 LV Systolic Diameter PLAX         3.0 cm                 

 IVS Diastolic Thickness           1.1 cm                0.6 - 1.0 / 0.6 - 0.9 cm 

 LVPW Diastolic Thickness          1.3 cm                0.6 - 1.0 / 0.6 - 0.9 cm 

 LV Relative Wall Thickness        0.5                    

 RV Internal Dim ED PLAX           3.0 cm                 

 LVOT Diameter                     2.2 cm                 

 Aortic Root Diameter              3.2 cm                 

 LA Systolic Diameter LX           2.8 cm                3.0 - 4.0 / 2.7 - 3.8 cm 

 LV Diastolic Volume MOD BP        52.2 cm???              67 - 155 / 56 - 104 cm??? 

 LV Systolic Volume MOD BP         21.9 cm???              22 - 58 / 19 - 49 cm??? 

 LV Ejection Fraction MOD BP       58.1 %                >= 55  % 

 LV Cardiac Index MOD BP           1039.0 cm???/min???m???      

 LV Diastolic Volume MOD 4C        66.5 cm???               

 LV Systolic Volume MOD 4C         31.2 cm???               

 LV Ejection Fraction MOD 4C       53.1 %                 

 LV Cardiac Index MOD 4C           1208.5 cm???/min???m???      

 LV Diastolic Length 4C            7.8 cm                 

 LV Systolic Length 4C             6.2 cm                 

 LV Diastolic Volume MOD 2C        39.6 cm???               

 LV Systolic Volume MOD 2C         15.2 cm???               

 LV Ejection Fraction MOD 2C       61.7 %                 

 LV Cardiac Index MOD 2C           837.4 cm???/min???m???       

 LV Diastolic Length 2C            7.5 cm                 

 LV Systolic Length 2C             6.1 cm                 

 LA Volume                         60.2 cm???              18 - 58 / 22 - 52 cm??? 

 LA Volume Index                   26.8 cm???/m???           16 - 28 cm???/m??? 

 Ascending Aorta Diameter          3.5 cm                 

 

 DOPPLER 

 AV Peak Velocity                  123.8 cm/s             

 AV Peak Gradient                  6.1 mmHg               

 LVOT Peak Velocity                119.8 cm/s             

 LVOT Peak Gradient                5.7 mmHg               

 LVOT Velocity Time Integral       23.0 cm                

 LVOT Stroke Volume                85.5 cm???               

 LVOT Stroke Volume Index          39.4 ml/m???             

 LVOT Cardiac Index                2928.9 cm???/min???m???      

 AV Area Cont Eq pk                3.6 cm???                

 MV Peak Velocity                  98.8 cm/s              

 MV Peak Gradient                  3.9 mmHg               

 MV Mean Velocity                  44.1 cm/s              

 MV Mean Gradient                  1.0 mmHg               

 MV Velocity Time Integral         26.7 cm                

 MR Peak Velocity                  314.3 cm/s             

 MR Peak Gradient                  39.5 mmHg              

 Mitral E Point Velocity           63.2 cm/s              

 Mitral A Point Velocity           79.0 cm/s              

 Mitral E to A Ratio               0.8                    

 MV Deceleration Time              227.5 ms               

 MV E' Velocity                    6.8 cm/s               

 Mitral E to MV E' Ratio           9.3                    

 TR Peak Velocity                  192.2 cm/s             

 TR Peak Gradient                  14.8 mmHg              

 Right Ventricular Systolic Press  19.8 mmHg              

 PV Peak Velocity                  108.5 cm/s             

 PV Peak Gradient                  4.7 mmHg               

 

 

 FINDINGS 

 Left Ventricle 

 Normal LV size. Mild concentric LVH. Left ventricular ejection fraction is  

 estimated at 55-60 %.  No obvious regional wall motion abnormality.   

 Indeterminate diastolic dysfunction 

 

 Right Ventricle 

 Normal right ventricular size. RVSP= 19.8mmHg 

 

 Right Atrium 

 Normal right atrial size. 

 

 Left Atrium 

 Mildly increased left atrial volume. LA volume index= 27.6ml/m2. 

 

 Mitral Valve 

 Structurally normal mitral valve. No mitral regurgitation. 

 

 Aortic Valve 

 Trileaflet aortic valve. No aortic valve stenosis or regurgitation. 

 

 Tricuspid Valve 

 Structurally normal tricuspid valve. Trace TR. 

 

 Pulmonic Valve 

 Pulmonic valve not well visualized. No pulmonic regurgitation. 

 

 Pericardium 

 Not well visualized. 

 

 Aorta 

 Normal size aortic root and proximal ascending aorta. 

 

 CONCLUSIONS 

 Left ventricular ejection fraction is estimated at 55-60 %. 

 Mild concentric LVH.  

 No obvious regional wall motion abnormality.   

 No significant valvular dysfunction 

 No pericardial effusion 

 Previewed by:  

 Dr Randy Pena 

 (Electronically Signed) 

 Final Date:      2023 16:28

## 2023-10-28 VITALS — SYSTOLIC BLOOD PRESSURE: 143 MMHG | RESPIRATION RATE: 15 BRPM | DIASTOLIC BLOOD PRESSURE: 82 MMHG | HEART RATE: 83 BPM

## 2023-10-28 RX ADMIN — Medication SCH MG: at 08:51

## 2023-10-28 RX ADMIN — PANTOPRAZOLE SODIUM SCH: 40 TABLET, DELAYED RELEASE ORAL at 06:21

## 2023-10-28 RX ADMIN — ENOXAPARIN SODIUM SCH: 40 INJECTION SUBCUTANEOUS at 08:51

## 2023-10-28 NOTE — P.DS
Providers


Date of admission: 


10/25/23 17:01





Expected date of discharge: 10/28/23


Attending physician: 


Efrain Parrish





Primary care physician: 


Josseline Ramos





Hospital Course: 








Diagnosis on discharge:








1.  Accelerated hypertension





2.  EtOH withdrawal





3.  Nicotine dependence





4.  Episode of dizziness





5. hypokalmeia.  Replace per protocol





6.  Elevated liver enzymes secondary to EtOH





7.  Cough after eating, will check swallow evaluation














Hospital course:





Abhishek Ko, is a 63-year-old male patient of Dr. ramos.  Patient reports 

that he feels his blood pressure is high over the past 3-4 days with episodes of

dizziness.  Patient reports he drinks a pint of vodka couple times a week.  

Symptoms reportedly started after he drink.  Patient denies being on any 

medications for blood pressure.  Chest x-ray completed showing no acute 

pulmonary process.  EKG completed showing sinus rhythm with sinus arrhythmia.  

Past medical history includes hypertension, EtOH and nicotine dependence.  

Initial lactic acid elevated at 4. 7 repeat 2.0.  At this time patient will be 

admitted started on alcohol withdrawal protocol.  Hydralazine when necessary 

ordered patient also started on Norvasc.  Will order carotid Doppler and 2-D 

echo replaced selectively per protocol.  Social work consult placed for 

discharge planning.  Current vital signs temp 96.5, heart rate 73, respiratory 

rate 18, blood pressure 153/90 with a pulse ox of 96% on room air.  At this time

patient denies chest pain or shortness of breath.  Patient denies nausea 

vomiting or diarrhea.  Patient denies any urinary burning or frequency





On 10/27/2023 patient is alert and oriented 3.  Patient denies any chest pain 

or shortness breath.  Patient denies nausea vomiting or diarrhea.  Patient was 

started on Norvasc for blood pressure control.  Current vital signs temp 98.7, 

heart rate 60, respiratory 19, blood pressure 151/95 with a pulse ox of 94%.  

Carotid Doppler was completed showing no ultrasound evidence for significant 

stenosis.  2-D echo has been more ordered.  Anticipate discharge in the next 24-

48 hours





On 10/28 2023 patient was seen and examined on the medical floor he is alert and

oriented 3 in no apparent distress there is no fever or chills no headache or 

dizziness no chest pain no shortness of breath no cough no nausea or vomiting no

abdominal pain no diarrhea and no urinary symptoms.


Patient improved gradually during this admission, blood pressure is still 

slightly elevated, during this admission he was started on amlodipine 5 mg 

daily, he was given a prescription for amlodipine at the time of discharge, he 

was also given a prescription for Ativan 0.5 mg 3 times daily as needed for 

agitation.  He was counseled in length in regards to alcohol cessation.  He will

follow-up with his primary care physician Dr. Ramos within one week.


Patient Condition at Discharge: Serious





Plan - Discharge Summary


Discharge Rx Participant: Yes


New Discharge Prescriptions: 


New


   Thiamine [Vitamin B-1] 100 mg PO DAILY 30 Days #30 tab


   LORazepam [Ativan] 0.5 mg PO TID PRN 3 Days #9 tab


     PRN Reason: Agitation


   amLODIPine [Norvasc] 5 mg PO DAILY 30 Days #30 tab


   Pantoprazole [Protonix] 40 mg PO AC-BRKFST 30 Days #30 tab





Continue


   Cyclobenzaprine [Flexeril] 10 mg PO HS PRN


     PRN Reason: Muscle Spasm


   Ibuprofen [Motrin] 600 mg PO TID PRN


     PRN Reason: Pain


Discharge Medication List





Cyclobenzaprine [Flexeril] 10 mg PO HS PRN 10/25/23 [History]


Ibuprofen [Motrin] 600 mg PO TID PRN 10/25/23 [History]


LORazepam [Ativan] 0.5 mg PO TID PRN 3 Days #9 tab 10/28/23 [Rx]


Pantoprazole [Protonix] 40 mg PO AC-BRKFST 30 Days #30 tab 10/28/23 [Rx]


Thiamine [Vitamin B-1] 100 mg PO DAILY 30 Days #30 tab 10/28/23 [Rx]


amLODIPine [Norvasc] 5 mg PO DAILY 30 Days #30 tab 10/28/23 [Rx]








Follow up Appointment(s)/Referral(s): 


Josseline Ramos MD [Primary Care Provider] - 1-2 days (Please call to schedule 

follow up visit. )


Patient Instructions/Handouts:  Alcohol Withdrawal (DC), Hypertensive Crisis 

(DC)


Discharge/Stand Alone Forms:  [facility] Meet[facility], Who Do I Call?, Community 

Resources, Inp Substance Abuse Facilities, Personal Care Manager

## 2024-08-29 ENCOUNTER — HOSPITAL ENCOUNTER (EMERGENCY)
Dept: HOSPITAL 47 - EC | Age: 64
Discharge: LEFT BEFORE DISCHARGE | End: 2024-08-29
Payer: MEDICARE

## 2024-08-29 VITALS
RESPIRATION RATE: 18 BRPM | DIASTOLIC BLOOD PRESSURE: 84 MMHG | SYSTOLIC BLOOD PRESSURE: 128 MMHG | TEMPERATURE: 97.8 F | HEART RATE: 72 BPM

## 2024-08-29 PROCEDURE — 99283 EMERGENCY DEPT VISIT LOW MDM: CPT

## 2024-08-29 NOTE — XR
EXAMINATION TYPE: XR foot complete LT

 

DATE OF EXAM: 8/29/2024

 

Comparison: None

 

Clinical History: 23-year-old male pain and redness for one month, non traumatic pain.

 

Findings:

Mild degenerative spurring and subchondral sclerosis at the first MTP joint. No soft tissue calcifica
tions. No marginal erosions. No acute fracture, subluxation, dislocation. Small posterior and plantar
 calcaneal spurs.

 

 

Impression:

Mild first MTP joint OA. Small heel spurs. No acute osseous abnormality seen.

## 2024-08-29 NOTE — ED
Extremity Problem HPI





- General


Chief complaint: Extremity Problem,Nontraumatic


Stated complaint: Both foot pain


Source: patient, RN notes reviewed, old records reviewed


Mode of arrival: ambulatory


Limitations: no limitations





- History of Present Illness


Initial comments: 





QN-63 year-old male to ER for evaluation of bilateral foot pain, foot pain and 

general





- Related Data


                                Home Medications











 Medication  Instructions  Recorded  Confirmed


 


Cyclobenzaprine [Flexeril] 10 mg PO HS PRN 10/25/23 10/25/23


 


Ibuprofen [Motrin] 600 mg PO TID PRN 10/25/23 10/25/23








                                  Previous Rx's











 Medication  Instructions  Recorded


 


LORazepam [Ativan] 0.5 mg PO TID PRN 3 Days #9 tab 10/28/23


 


Pantoprazole [Protonix] 40 mg PO AC-BRKFST 30 Days #30 tab 10/28/23


 


Thiamine [Vitamin B-1] 100 mg PO DAILY 30 Days #30 tab 10/28/23


 


amLODIPine [Norvasc] 5 mg PO DAILY 30 Days #30 tab 10/28/23











                                    Allergies











Allergy/AdvReac Type Severity Reaction Status Date / Time


 


No Known Allergies Allergy   Verified 08/29/24 14:15














Review of Systems


ROS Statement: 


Those systems with pertinent positive or pertinent negative responses have been 

documented in the HPI.





ROS Other: All systems not noted in ROS Statement are negative.





Past Medical History


Past Medical History: Hypertension


History of Any Multi-Drug Resistant Organisms: None Reported


Past Surgical History: Tonsillectomy


Additional Past Surgical History / Comment(s): ENT surgery


Past Anesthesia/Blood Transfusion Reactions: No Reported Reaction


Past Psychological History: No Psychological Hx Reported


Smoking Status: Current every day smoker


Past Alcohol Use History: Occasional


Past Drug Use History: None Reported





General Exam


Limitations: no limitations


General appearance: alert, in no apparent distress


Head exam: Present: atraumatic, normocephalic, normal inspection


Eye exam: Present: normal appearance, PERRL, EOMI.  Absent: scleral icterus, 

conjunctival injection, periorbital swelling


ENT exam: Present: normal exam, mucous membranes moist


Neck exam: Present: normal inspection.  Absent: tenderness, meningismus, 

lymphadenopathy


Respiratory exam: Present: normal lung sounds bilaterally.  Absent: respiratory 

distress, wheezes, rales, rhonchi, stridor


Cardiovascular Exam: Present: regular rate, normal rhythm, normal heart sounds. 

 Absent: systolic murmur, diastolic murmur, rubs, gallop, clicks


GI/Abdominal exam: Present: soft, normal bowel sounds.  Absent: distended, 

tenderness, guarding, rebound, rigid


Extremities exam: Present: normal inspection, full ROM, normal capillary refill.

  Absent: tenderness, pedal edema, joint swelling, calf tenderness


Back exam: Present: normal inspection


Neurological exam: Present: alert, oriented X3, CN II-XII intact


Psychiatric exam: Present: normal affect, normal mood


Skin exam: Present: warm, dry, intact, normal color.  Absent: rash





Course


                                   Vital Signs











  08/29/24





  14:12


 


Temperature 97.8 F


 


Pulse Rate 72


 


Respiratory 18





Rate 


 


Blood Pressure 128/84


 


O2 Sat by Pulse 97





Oximetry 














- Reevaluation(s)


Reevaluation #1: 





QN completed by myself Dr Chapman





Medical Decision Making





- Medical Decision Making





63 male to ER with foot pain.  Patient leaves AGAINST MEDICAL ADVICE





- Radiology Data


Radiology results: report reviewed (X-ray negative for acute disease), image 

reviewed





Disposition


Clinical Impression: 


 Left against medical advice





Disposition: LEFT AGAINST MEDICAL ADVICE


Condition: Undetermined


Is patient prescribed a controlled substance at d/c from ED?: No


Referrals: 


Josseline Ramos MD [Primary Care Provider] - 1-2 days

## 2024-09-11 ENCOUNTER — HOSPITAL ENCOUNTER (EMERGENCY)
Dept: HOSPITAL 47 - EC | Age: 64
Discharge: HOME | End: 2024-09-11
Payer: MEDICARE

## 2024-09-11 VITALS — SYSTOLIC BLOOD PRESSURE: 125 MMHG | DIASTOLIC BLOOD PRESSURE: 77 MMHG | RESPIRATION RATE: 16 BRPM | HEART RATE: 60 BPM

## 2024-09-11 VITALS — TEMPERATURE: 98 F

## 2024-09-11 PROCEDURE — 99283 EMERGENCY DEPT VISIT LOW MDM: CPT

## 2024-09-11 RX ADMIN — IBUPROFEN STA EACH: 600 TABLET ORAL at 22:32

## 2024-09-11 NOTE — ED
Extremity Problem HPI





- General


Chief complaint: Extremity Problem,Nontraumatic


Stated complaint: R foot pain


Time Seen by Provider: 09/11/24 19:46


Source: patient


Mode of arrival: ambulatory


Limitations: no limitations





- History of Present Illness


Initial comments: 








64-year-old male with history of plantar fasciitis presenting with chief comp

laint of bilateral foot pain.  Pain is worse on the right foot.  He denies any 

new injury or trauma.  States that he was supposed to have x-rays several days 

ago but has not been able to get them.  Has not been taking anything for pain at

home.  No redness swelling fever.  No numbness tingling or weakness








- Related Data


                                Home Medications











 Medication  Instructions  Recorded  Confirmed


 


Cyclobenzaprine [Flexeril] 10 mg PO HS PRN 10/25/23 10/25/23


 


Ibuprofen [Motrin] 600 mg PO TID PRN 10/25/23 10/25/23








                                  Previous Rx's











 Medication  Instructions  Recorded


 


LORazepam [Ativan] 0.5 mg PO TID PRN 3 Days #9 tab 10/28/23


 


Pantoprazole [Protonix] 40 mg PO AC-BRKFST 30 Days #30 tab 10/28/23


 


Thiamine [Vitamin B-1] 100 mg PO DAILY 30 Days #30 tab 10/28/23


 


amLODIPine [Norvasc] 5 mg PO DAILY 30 Days #30 tab 10/28/23


 


Ibuprofen 600 mg PO Q6H PRN #20 tab 09/11/24











                                    Allergies











Allergy/AdvReac Type Severity Reaction Status Date / Time


 


No Known Allergies Allergy   Verified 09/11/24 19:28














Review of Systems


ROS Statement: 


Those systems with pertinent positive or pertinent negative responses have been 

documented in the HPI.





ROS Other: All systems not noted in ROS Statement are negative.





Past Medical History


Past Medical History: Hypertension


History of Any Multi-Drug Resistant Organisms: None Reported


Past Surgical History: Hernia Repair, Tonsillectomy


Additional Past Surgical History / Comment(s): ENT surgery


Past Anesthesia/Blood Transfusion Reactions: No Reported Reaction


Past Psychological History: No Psychological Hx Reported


Smoking Status: Current every day smoker


Past Alcohol Use History: Occasional


Past Drug Use History: None Reported





General Exam





- General Exam Comments


Initial Comments: 


Visual Physical Exam





Vital signs reviewed





General: Well-appearing, nontoxic, no acute distress.


Head: Normocephalic, atraumatic


Eyes: PERRLA, EOMI


ENT: Airway patent


Chest: Nonlabored breathing


Skin: No visual rash, normal skin tone


Neuro: Alert and oriented 3


Musculoskeletal: No gross abnormalities








Limitations: no limitations


General appearance: alert, in no apparent distress


Head exam: Present: atraumatic, normocephalic


Eye exam: Present: normal appearance, EOMI


Neck exam: Present: normal inspection.  Absent: meningismus


Respiratory exam: Absent: respiratory distress


Extremities exam: Present: normal inspection, full ROM


Neurological exam: Present: alert, oriented X3


Psychiatric exam: Present: normal affect, normal mood


Skin exam: Present: warm, dry





Course


                                   Vital Signs











  09/11/24 09/11/24





  19:28 22:35


 


Temperature 98.0 F 


 


Pulse Rate 103 H 60


 


Respiratory 20 16





Rate  


 


Blood Pressure 105/60 125/77


 


O2 Sat by Pulse 97 100





Oximetry  














Medical Decision Making





- Medical Decision Making


Was pt. sent in by a medical professional or institution (Dr. PA, NP, urgent 

care, hospital, or nursing home...) When possible be specific


@  -No


Did you speak to anyone other than the patient for history (EMS, parent, family,

 police, friend...)? What history was obtained from this source 


@  -No


Did you review nursing and triage notes (agree or disagree)?  Why? 


@  -I reviewed and agree with nursing and triage notes


Were old charts reviewed (outside hosp., previous admission, EMS record, old 

EKG, old radiological studies, urgent care reports/EKG's, nursing home records)?

 Report findings 


@  -No old charts were reviewed


Differential Diagnosis (chest pain, altered mental status, abdominal pain women,

 abdominal pain men, vaginal bleeding, weakness, fever, dyspnea, syncope, 

headache, dizziness, GI bleed, back pain, seizure, CVA, palpatations, mental 

health, musculoskeletal)? 


@  -Differential Musculoskeletal


Muscular strain, contusion, ligament sprain, fracture, arthritis, septic 

arthritis, bursitis, cellulitis, muscle spasm, nerve compression, DVT, arterial 

occlusion, herpes zoster, electrolyte abnormality, tumor.... This is not meant 

to be in all inclusive list


EKG interpreted by me (3pts min.).


@  -As above


X-rays interpreted by me (1pt min.).


@  -X-ray showed no evidence of acute fracture.  Multifocal degeneration changes

 throughout the joints of the foot findings worse at the first digit 

metatarsophalangeal joint


CT interpreted by me (1pt min.).


@  -None done


U/S interpreted by me (1pt. min.).


@  -None done


What testing was considered but not performed or refused? (CT, X-rays, U/S, 

labs)? Why?


@  -None


What meds were considered but not given or refused? Why?


@  -None


Did you discuss the management of the patient with other professionals 

(professionals i.e. Dr., PA, NP, lab, RT, psych nurse, , , 

teacher, , )? Give summary


@  -No


Was smoking cessation discussed for >3mins.?


@  -No


Was critical care preformed (if so, how long)?


@  -No


Were there social determinants of health that impacted care today? How? 

(Homelessness, low income, unemployed, alcoholism, drug addiction, 

transportation, low edu. Level, literacy, decrease access to med. care, long-term, 

rehab)?


@  -No


Was there de-escalation of care discussed even if they declined (Discuss DNR or 

withdrawal of care, Hospice)? DNR status


@  -No


What co-morbidities impacted this encounter? (DM, HTN, Smoking, COPD, CAD, Ca

ncer, CVA, ARF, Chemo, Hep., AIDS, mental health diagnosis, sleep apnea, morbid 

obesity)?


@  -None


Was patient admitted / discharged? Hospital course, mention meds given and 

route, prescriptions, significant lab abnormalities, going to OR and other 

pertinent info.


@  -64-year-old male presenting with chief complaint of bilateral foot pain 

worse on the right.  Feels like a flareup of his plantar fasciitis pain.  X-rays

 are negative for acute osseous process.  Educated on today's findings and 

provided with ibuprofen.  Discharged.  Follow-up with PCP.  Report back to ER 

with any new or worsening symptoms.  Discussed return parameters and answered 

all questions.  Patient conveyed verbal understanding and agreed to the plan.  I

 discussed this case in detail with my attending Dr. Cordova


Undiagnosed new problem with uncertain prognosis?


@  -No


Drug Therapy requiring intensive monitoring for toxicity (Heparin, Nitro, 

Insulin, Cardizem)?


@  -No


Were any procedures done?


@  -No


Diagnosis/symptom?


@  -Plantar fasciitis


Acute, or Chronic, or Acute on Chronic?


@  -Acute on chronic


Uncomplicated (without systemic symptoms) or Complicated (systemic symptoms)?


@  -Uncomplicated


Side effects of treatment?


@  -No


Exacerbation, Progression, or Severe Exacerbation?


@  -No


Poses a threat to life or bodily function? How? (Chest pain, USA, MI, pneumonia,

 PE, COPD, DKA, ARF, appy, cholecystitis, CVA, Diverticulitis, Homicidal, 

Suicidal, threat to staff... and all critical care pts)


@  -No











Disposition


Clinical Impression: 


 Plantar fasciitis





Disposition: HOME SELF-CARE


Condition: Good


Instructions (If sedation given, give patient instructions):  Plantar Fasciitis 

(ED), Plantar Fasciitis Exercises (ED)


Additional Instructions: 


Follow-up with PCP.  Report back to ER with any new or worsening symptoms.  Take

 Motrin Tylenol as needed for pain control.  Rest ice and elevate the feet.


Prescriptions: 


Ibuprofen 600 mg PO Q6H PRN #20 tab


 PRN Reason: Pain


Is patient prescribed a controlled substance at d/c from ED?: No


Referrals: 


Alirio Britt MD [STAFF PHYSICIAN] - 1-2 days


Time of Disposition: 21:46

## 2024-09-11 NOTE — XR
EXAMINATION TYPE: XR foot complete RT

 

DATE OF EXAM: 9/11/2024 8:23 PM

 

CLINICAL INDICATION: Male, 64 years old with history of foot pain; [facility]

 

COMPARISON: 8/29/2024

 

TECHNIQUE: XR foot complete RT examined in the AP, oblique, and lateral projections.  

 

FINDINGS: 

No evidence of any acute osseous pathology. Calcaneal plantar spurring is present. Multifocal degener
ation changes throughout the joints of the foot with osteophyte formation and joint space narrowing. 
Calcaneal Achilles enthesophyte.

 

IMPRESSION: 

1.  No evidence of acute fracture.

2.  Multifocal degeneration changes throughout the joints of the foot findings worse at the first dig
it metatarsophalangeal joint.

## 2024-10-16 ENCOUNTER — HOSPITAL ENCOUNTER (EMERGENCY)
Dept: HOSPITAL 47 - EC | Age: 64
LOS: 1 days | Discharge: HOME | End: 2024-10-17
Payer: COMMERCIAL

## 2024-10-16 PROCEDURE — 99283 EMERGENCY DEPT VISIT LOW MDM: CPT

## 2024-10-16 NOTE — ED
Eye Problem HPI





- General


Chief complaint: Eye Problems


Stated complaint: Foot pain,Eye issue


Time Seen by Provider: 10/16/24 22:48


Source: patient, RN notes reviewed


Mode of arrival: ambulatory


Limitations: no limitations





- History of Present Illness


Initial comments: 


64-year-old male presents emergency room chief complaint of left eye pain.  

States that approximately 2 days ago he was hit in the left eye with a branch.  

States that he has been having photophobia and pain of the eye and pain with eye

movement.  Denies contact lens use or history of ophthalmologic surgeries of the

left eye.  Denies fevers, chills, nausea, vomiting..  States that he has been 

experiencing bilateral foot pain as well.  States that he is homeless and his 

boots have not been keeping his feet warm.








- Related Data


                                Home Medications











 Medication  Instructions  Recorded  Confirmed


 


Cyclobenzaprine [Flexeril] 10 mg PO HS PRN 10/25/23 10/25/23


 


Ibuprofen [Motrin] 600 mg PO TID PRN 10/25/23 10/25/23








                                  Previous Rx's











 Medication  Instructions  Recorded


 


LORazepam [Ativan] 0.5 mg PO TID PRN 3 Days #9 tab 10/28/23


 


Pantoprazole [Protonix] 40 mg PO AC-BRKFST 30 Days #30 tab 10/28/23


 


Thiamine [Vitamin B-1] 100 mg PO DAILY 30 Days #30 tab 10/28/23


 


amLODIPine [Norvasc] 5 mg PO DAILY 30 Days #30 tab 10/28/23


 


Ibuprofen 600 mg PO Q6H PRN #20 tab 09/11/24











                                    Allergies











Allergy/AdvReac Type Severity Reaction Status Date / Time


 


No Known Allergies Allergy   Verified 09/11/24 19:28














Review of Systems


ROS Statement: 


Those systems with pertinent positive or pertinent negative responses have been 

documented in the HPI.





ROS Other: All systems not noted in ROS Statement are negative.





Past Medical History


Past Medical History: Hypertension


History of Any Multi-Drug Resistant Organisms: None Reported


Past Surgical History: Hernia Repair, Tonsillectomy


Additional Past Surgical History / Comment(s): ENT surgery


Past Anesthesia/Blood Transfusion Reactions: No Reported Reaction


Past Psychological History: No Psychological Hx Reported


Smoking Status: Current every day smoker


Past Alcohol Use History: Occasional


Past Drug Use History: None Reported





General Exam


Limitations: no limitations


General appearance: alert, in no apparent distress


  ** Expanded


Eyelids: Normal Inspection: Bilateral


Pupils: Regular, Round: Bilateral


Sclera/Conjunctival: Injection: Left


Visual acuity (R) = 20/: 20


Visual acuity (L) = 20/: 20


With correction: No


ENT exam: Present: normal exam, mucous membranes moist


Neck exam: Present: normal inspection.  Absent: tenderness, meningismus, 

lymphadenopathy


Respiratory exam: Present: normal lung sounds bilaterally.  Absent: respiratory 

distress, wheezes, rales, rhonchi, stridor


Cardiovascular Exam: Present: regular rate, normal rhythm, normal heart sounds. 

 Absent: systolic murmur, diastolic murmur, rubs, gallop, clicks


GI/Abdominal exam: Present: soft, normal bowel sounds.  Absent: distended, 

tenderness, guarding, rebound, rigid


Psychiatric exam: Present: normal affect, normal mood


Skin exam: Present: warm, dry, intact, normal color.  Absent: rash





Course


                                   Vital Signs











  10/16/24





  22:39


 


Temperature 98.3 F


 


Pulse Rate 81


 


Respiratory 20





Rate 


 


Blood Pressure 109/72


 


O2 Sat by Pulse 97





Oximetry 














Medical Decision Making





- Medical Decision Making


Was pt. sent in by a medical professional or institution (GABRIEL Bartlett, NP, urgent 

care, hospital, or nursing home...) When possible be specific


@ -No


Did you speak to anyone other than the patient for history (EMS, parent, family,

 police, friend...)? What history was obtained from this source 


@ -No


Did you review nursing and triage notes (agree or disagree)? Why? 


@ -I reviewed and agree with nursing and triage notes


Were old charts reviewed (outside hosp., previous admission, EMS record, old 

EKG, old radiological studies, urgent care reports/EKG's, nursing home records)?

 Report findings 


@ -No old charts were reviewed


Differential Diagnosis (chest pain, altered mental status, abdominal pain women,

 abdominal pain men, vaginal bleeding, weakness, fever, dyspnea, syncope, 

headache, dizziness, GI bleed, back pain, seizure, CVA, palpatations, mental 

health, musculoskeletal)? 


@ -Corneal abrasion, conjunctivitis, glaucoma, this list is not all inclusive.


EKG interpreted by me (3pts min.).


@ -none


X-rays interpreted by me (1pt min.).


@ -None done


CT interpreted by me (1pt min.).


@ -None done


U/S interpreted by me (1pt. min.).


@ -None done


What testing was considered but not performed or refused? (CT, X-rays, U/S, 

labs)? Why?


@ -None


What meds were considered but not given or refused? Why?


@ -None


Did you discuss the management of the patient with other professionals (amelia hamilton i.e. , PA, NP, lab, RT, psych nurse, , , teacher, 

, )? Give summary


@ -No


Was smoking cessation discussed for >3mins.?


@ -No


Was critical care preformed (if so, how long)?


@ -No


Were there social determinants of health that impacted care today? How? 

(Homelessness, low income, unemployed, alcoholism, drug addiction, 

transportation, low edu. Level, literacy, decrease access to med. care, detention, 

rehab)?


@ -No


Was there de-escalation of care discussed even if they declined (Discuss DNR or 

withdrawal of care, Hospice)? DNR status


@ -No


What co-morbidities impacted this encounter? (DM, HTN, Smoking, COPD, CAD, 

Cancer, CVA, ARF, Chemo, Hep., AIDS, mental health diagnosis, sleep apnea, 

morbid obesity)?


@ -None


Was patient admitted / discharged? Hospital course, mention meds given and 

route, prescriptions, significant lab abnormalities, going to OR and other 

pertinent info.


@Discharge.  64-year-old male with left eye pain.  On evaluation patient noted 

to rather disheveled with torn thing and shoes.  Vitals are stable.  He is noted

 to have left eye pain and photophobia.  Patient's pupils are equal round and 

reactive with mild conjunctival injection.  Patient's visual acuity is intact.  

Topical proparacaine drops were used to anesthetize the left eye and fluorescein

 stain was used with direct visualization under a Woods lamp to reveal a left 

corneal abrasion.  Patient is provided with topical antibiotic drops emergency 

department and will be given topical drops to take home with him and instructed 

to continue 2 drops 6 hours in the left eye for 5 days.  Discussed with 




Undiagnosed new problem with uncertain prognosis?


@ -No


Drug Therapy requiring intensive monitoring for toxicity (Heparin, Nitro, 

Insulin, Cardizem)?


@ -No


Were any procedures done?


@ -No


Diagnosis/symptom?


@ -corneal abrasion


Acute, or Chronic, or Acute on Chronic?


@ -Acute


Uncomplicated (without systemic symptoms) or Complicated (systemic symptoms)?


@ -uncomplicated


Side effects of treatment?


@ -No


Exacerbation, Progression, or Severe Exacerbation?


@ -No


Poses a threat to life or bodily function? How? (Chest pain, USA, MI, pneumonia,

 PE, COPD, DKA, ARF, appy, cholecystitis, CVA, Diverticulitis, Homicidal, 

Suicidal, threat to staff... and all critical care pts)


@ -No








Disposition


Clinical Impression: 


 Corneal abrasion





Disposition: HOME SELF-CARE


Condition: Stable


Instructions (If sedation given, give patient instructions):  Corneal Abrasion 

(ED)


Additional Instructions: 


Please return to the Emergency Department if symptoms worsen or any other 

concerns.  Continue to use antibiotic drops in the left eye, 2 drops every 6 

hours for 5 days


Is patient prescribed a controlled substance at d/c from ED?: No


Referrals: 


Pradeep Kirkpatrick MD [Primary Care Provider] - 1-2 days


Time of Disposition: 00:25

## 2024-10-17 VITALS
RESPIRATION RATE: 16 BRPM | SYSTOLIC BLOOD PRESSURE: 140 MMHG | TEMPERATURE: 97.4 F | DIASTOLIC BLOOD PRESSURE: 83 MMHG | HEART RATE: 63 BPM

## 2024-10-17 RX ADMIN — CIPROFLOXACIN HYDROCHLORIDE STA DROPS: 3 SOLUTION/ DROPS OPHTHALMIC at 01:15

## 2024-10-17 RX ADMIN — PROPARACAINE HYDROCHLORIDE STA DROPS: 5 SOLUTION/ DROPS OPHTHALMIC at 00:16

## 2024-10-17 RX ADMIN — FLUORESCEIN SODIUM ONE MG: 1 STRIP OPHTHALMIC at 00:16
